# Patient Record
Sex: FEMALE | Race: WHITE | NOT HISPANIC OR LATINO | Employment: FULL TIME | URBAN - METROPOLITAN AREA
[De-identification: names, ages, dates, MRNs, and addresses within clinical notes are randomized per-mention and may not be internally consistent; named-entity substitution may affect disease eponyms.]

---

## 2023-09-15 PROBLEM — B00.51 HERPES SIMPLEX IRIDOCYCLITIS: Status: ACTIVE | Noted: 2023-09-15

## 2023-09-15 PROBLEM — R06.09 DYSPNEA ON EXERTION: Status: ACTIVE | Noted: 2023-09-15

## 2023-09-15 PROBLEM — B00.52 HERPES SIMPLEX KERATITIS: Status: ACTIVE | Noted: 2023-09-15

## 2023-09-15 PROBLEM — B00.50 OPHTHALMIC HERPES SIMPLEX: Status: ACTIVE | Noted: 2023-09-15

## 2023-09-15 PROBLEM — N95.1 MENOPAUSAL STATE: Status: ACTIVE | Noted: 2023-09-15

## 2023-09-15 PROBLEM — B02.30 HERPES ZOSTER OPHTHALMICUS: Status: ACTIVE | Noted: 2023-09-15

## 2023-09-15 PROBLEM — E55.9 VITAMIN D DEFICIENCY: Status: ACTIVE | Noted: 2023-09-15

## 2023-09-15 PROBLEM — H16.292: Status: ACTIVE | Noted: 2023-09-15

## 2023-09-15 PROBLEM — B00.53 HERPES SIMPLEX CONJUNCTIVITIS: Status: ACTIVE | Noted: 2023-09-15

## 2023-09-15 PROBLEM — I82.90 VENOUS THROMBOSIS: Status: ACTIVE | Noted: 2023-09-15

## 2023-09-15 RX ORDER — LYSINE HCL 500 MG
1 TABLET ORAL DAILY
COMMUNITY

## 2023-09-15 RX ORDER — METHYLPREDNISOLONE 4 MG/1
TABLET ORAL
COMMUNITY
Start: 2023-01-31 | End: 2023-12-06 | Stop reason: WASHOUT

## 2023-09-15 RX ORDER — CARBAMAZEPINE 200 MG/1
1 TABLET ORAL 2 TIMES DAILY
COMMUNITY
Start: 2023-01-24 | End: 2023-12-06 | Stop reason: WASHOUT

## 2023-10-25 ENCOUNTER — OFFICE VISIT (OUTPATIENT)
Dept: OPHTHALMOLOGY | Facility: CLINIC | Age: 73
End: 2023-10-25
Payer: COMMERCIAL

## 2023-10-25 DIAGNOSIS — B02.30 HERPES ZOSTER OPHTHALMICUS: Primary | ICD-10-CM

## 2023-10-25 DIAGNOSIS — H47.392 MYELINATED NERVE FIBERS OF OPTIC DISC OF LEFT EYE: ICD-10-CM

## 2023-10-25 DIAGNOSIS — B00.50 OPHTHALMIC HERPES SIMPLEX: ICD-10-CM

## 2023-10-25 DIAGNOSIS — B02.33 HERPES ZOSTER KERATOCONJUNCTIVITIS: ICD-10-CM

## 2023-10-25 DIAGNOSIS — H04.123 DRY EYES, BILATERAL: ICD-10-CM

## 2023-10-25 DIAGNOSIS — H25.813 COMBINED FORMS OF AGE-RELATED CATARACT OF BOTH EYES: ICD-10-CM

## 2023-10-25 PROBLEM — I82.90 VENOUS THROMBOSIS: Status: RESOLVED | Noted: 2023-09-15 | Resolved: 2023-10-25

## 2023-10-25 PROBLEM — B00.52 HERPES SIMPLEX KERATITIS: Status: RESOLVED | Noted: 2023-09-15 | Resolved: 2023-10-25

## 2023-10-25 PROBLEM — B00.51 HERPES SIMPLEX IRIDOCYCLITIS: Status: RESOLVED | Noted: 2023-09-15 | Resolved: 2023-10-25

## 2023-10-25 PROBLEM — B00.53 HERPES SIMPLEX CONJUNCTIVITIS: Status: RESOLVED | Noted: 2023-09-15 | Resolved: 2023-10-25

## 2023-10-25 PROCEDURE — 99213 OFFICE O/P EST LOW 20 MIN: CPT | Performed by: OPHTHALMOLOGY

## 2023-10-25 RX ORDER — VALACYCLOVIR HYDROCHLORIDE 500 MG/1
500 TABLET, FILM COATED ORAL DAILY
COMMUNITY
End: 2023-10-25 | Stop reason: SDUPTHER

## 2023-10-25 RX ORDER — PREDNISOLONE ACETATE 10 MG/ML
1 SUSPENSION/ DROPS OPHTHALMIC
COMMUNITY
Start: 2023-09-01 | End: 2023-12-06 | Stop reason: WASHOUT

## 2023-10-25 RX ORDER — VALACYCLOVIR HYDROCHLORIDE 500 MG/1
500 TABLET, FILM COATED ORAL DAILY
Qty: 30 TABLET | Refills: 2 | Status: SHIPPED | OUTPATIENT
Start: 2023-10-25

## 2023-10-25 ASSESSMENT — ENCOUNTER SYMPTOMS
HEMATOLOGIC/LYMPHATIC NEGATIVE: 0
GASTROINTESTINAL NEGATIVE: 0
ALLERGIC/IMMUNOLOGIC NEGATIVE: 0
PSYCHIATRIC NEGATIVE: 0
ENDOCRINE NEGATIVE: 0
MUSCULOSKELETAL NEGATIVE: 0
RESPIRATORY NEGATIVE: 0
CONSTITUTIONAL NEGATIVE: 0
CARDIOVASCULAR NEGATIVE: 0
EYES NEGATIVE: 0
NEUROLOGICAL NEGATIVE: 0

## 2023-10-25 ASSESSMENT — EXTERNAL EXAM - RIGHT EYE: OD_EXAM: 1+ BROW PTOSIS

## 2023-10-25 ASSESSMENT — REFRACTION_WEARINGRX
OS_AXIS: 105
OD_AXIS: 4
OS_CYLINDER: -0.25
OS_ADD: 2.50
OD_SPHERE: -1.75
SPECS_TYPE: PROGRESSIVE
OD_ADD: 2.50
OD_CYLINDER: -1.00
OS_SPHERE: -1.00

## 2023-10-25 ASSESSMENT — VISUAL ACUITY
OD_PH_CC: 20/25
OS_CC: 20/30
CORRECTION_TYPE: GLASSES
METHOD: SNELLEN - LINEAR
OD_CC: 20/40
OD_CC+: -1

## 2023-10-25 NOTE — PROGRESS NOTES
Subjective   Patient ID: Amy Colon is a 72 y.o. female.    Chief Complaint    Follow-up       HPI    No visual acuity (VA) complaints.    Follow up iritis HZO left eye (OS).  Now only on valtrex po qam.  No new changes in health history or meds.  Vision is good and stable.  No new complaints or problems.    Last edited by Salas Patten MD on 10/25/2023 12:23 PM.        Current Outpatient Medications (Ophthalmology pharm classes)   Medication Sig Dispense Refill    prednisoLONE acetate (Pred-Forte) 1 % ophthalmic suspension Administer 1 drop into the left eye. Every other day       Current Outpatient Medications (Other)   Medication Sig Dispense Refill    CALCIUM LACTATE ORAL Take 1 capsule by mouth once daily.      lysine  mg tablet Take 1 tablet by mouth once daily.      NON FORMULARY Immuplex Lozenge      NON FORMULARY 2 times a day. Tumeric      valACYclovir (Valtrex) 500 mg tablet Take 1 tablet (500 mg) by mouth once daily.      carBAMazepine (TEGretol) 200 mg tablet Take 1 tablet (200 mg) by mouth 2 times a day.      methylPREDNISolone (Medrol, Noel,) 4 mg tablets Take by mouth. As directed         Objective   Base Eye Exam       Visual Acuity (Snellen - Linear)         Right Left    Dist cc 20/40 -1 20/30    Dist ph cc 20/25       Correction: Glasses                  Slit Lamp and Fundus Exam       External Exam         Right Left    External 1+ Brow ptosis 1+ Brow ptosis; former HZO left eye (OS).              Slit Lamp Exam         Right Left    Lids/Lashes 1+ Blepharitis, 1+ Dermatochalasis - lower lid 1+ Blepharitis, 1+ Dermatochalasis - lower lid    Conjunctiva/Sclera normal bulbar and palepbral conjunctiva normal bulbar and palepbral conjunctiva    Cornea normal epi/stroma/endo and tear film normal epi/stroma/endo and tear film    Anterior Chamber ant. chamber deep and quiet ant. 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           RESOLVED: Ophthalmic herpes simplex - Primary

## 2023-12-06 ENCOUNTER — OFFICE VISIT (OUTPATIENT)
Dept: PRIMARY CARE | Facility: CLINIC | Age: 73
End: 2023-12-06
Payer: COMMERCIAL

## 2023-12-06 VITALS
TEMPERATURE: 96.1 F | WEIGHT: 128.1 LBS | DIASTOLIC BLOOD PRESSURE: 84 MMHG | HEART RATE: 73 BPM | OXYGEN SATURATION: 99 % | HEIGHT: 59 IN | SYSTOLIC BLOOD PRESSURE: 120 MMHG | RESPIRATION RATE: 18 BRPM | BODY MASS INDEX: 25.83 KG/M2

## 2023-12-06 DIAGNOSIS — E55.9 VITAMIN D DEFICIENCY: ICD-10-CM

## 2023-12-06 DIAGNOSIS — Z12.11 SCREENING FOR MALIGNANT NEOPLASM OF COLON: ICD-10-CM

## 2023-12-06 DIAGNOSIS — N95.1 MENOPAUSAL STATE: ICD-10-CM

## 2023-12-06 DIAGNOSIS — R15.1 FECAL SMEARING: ICD-10-CM

## 2023-12-06 DIAGNOSIS — Z12.31 ENCOUNTER FOR SCREENING MAMMOGRAM FOR MALIGNANT NEOPLASM OF BREAST: ICD-10-CM

## 2023-12-06 DIAGNOSIS — Z00.00 ANNUAL PHYSICAL EXAM: Primary | ICD-10-CM

## 2023-12-06 PROBLEM — R06.09 DYSPNEA ON EXERTION: Status: RESOLVED | Noted: 2023-09-15 | Resolved: 2023-12-06

## 2023-12-06 PROBLEM — H04.129 TEAR FILM INSUFFICIENCY: Status: ACTIVE | Noted: 2023-12-06

## 2023-12-06 PROBLEM — H04.123 DRY EYE SYNDROME OF BILATERAL LACRIMAL GLANDS: Status: ACTIVE | Noted: 2023-12-06

## 2023-12-06 PROBLEM — H52.31 ANISOMETROPIA: Status: ACTIVE | Noted: 2023-12-06

## 2023-12-06 PROBLEM — H25.10 NUCLEAR SENILE CATARACT: Status: ACTIVE | Noted: 2023-12-06

## 2023-12-06 PROBLEM — Q14.1 CONGENITAL MALFORMATION OF RETINA: Status: ACTIVE | Noted: 2023-12-06

## 2023-12-06 PROCEDURE — 1036F TOBACCO NON-USER: CPT | Performed by: FAMILY MEDICINE

## 2023-12-06 PROCEDURE — 99397 PER PM REEVAL EST PAT 65+ YR: CPT | Performed by: FAMILY MEDICINE

## 2023-12-06 PROCEDURE — 1126F AMNT PAIN NOTED NONE PRSNT: CPT | Performed by: FAMILY MEDICINE

## 2023-12-06 PROCEDURE — 1159F MED LIST DOCD IN RCRD: CPT | Performed by: FAMILY MEDICINE

## 2023-12-06 ASSESSMENT — LIFESTYLE VARIABLES
HOW MANY STANDARD DRINKS CONTAINING ALCOHOL DO YOU HAVE ON A TYPICAL DAY: PATIENT DOES NOT DRINK
HOW OFTEN DO YOU HAVE SIX OR MORE DRINKS ON ONE OCCASION: NEVER
AUDIT-C TOTAL SCORE: 0
HOW OFTEN DO YOU HAVE A DRINK CONTAINING ALCOHOL: NEVER
SKIP TO QUESTIONS 9-10: 1

## 2023-12-06 ASSESSMENT — PATIENT HEALTH QUESTIONNAIRE - PHQ9
2. FEELING DOWN, DEPRESSED OR HOPELESS: NOT AT ALL
1. LITTLE INTEREST OR PLEASURE IN DOING THINGS: NOT AT ALL
SUM OF ALL RESPONSES TO PHQ9 QUESTIONS 1 AND 2: 0

## 2023-12-06 ASSESSMENT — PAIN SCALES - GENERAL: PAINLEVEL: 0-NO PAIN

## 2023-12-06 NOTE — PROGRESS NOTES
"History Of Present Illness  Amy Colon is a 72 y.o. female presenting for \"Annual Exam (Physical. Patient wants alll testing done that can be done as she will lose insurance in Feb 2024 such as colonoscopy, mammogram,etc).\"    Here for her annual wellness exam.  Colon cancer screening is up-to-date, negative Cologuard on 6/28/2021.  She declines immunizations.      She sees Dr. Patten for herpes zoster ophthalmicus which is stable. Also recently had right cataract surgery with a different ophthalmologist.    She also follows with Dr. Seymour for feet care, had recent ingrown toenail removal on left big toe. Pain controlled. Today, wound appears to be healing routinely.    Her only concern today is fecal incontinence. She reports that for years she has had leaking from her anus. The discharge is usually not bloody or clear. When it is occasionally bloody she attributed it to hemorrhoids. The leaking is worsening so she is having to wear a pad. She otherwise has normal bowel movements. It is not associated with any other symptoms.              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    lysine  mg tablet Take 1 tablet by mouth once daily.    NON FORMULARY Immuplex Lozenge    NON FORMULARY 2 times a day. Tumeric    valACYclovir (Valtrex) 500 mg tablet Take 1 tablet (500 mg) by mouth once daily.    [DISCONTINUED] carBAMazepine " (TEGretol) 200 mg tablet Take 1 tablet (200 mg) by mouth 2 times a day.    [DISCONTINUED] methylPREDNISolone (Medrol, Noel,) 4 mg tablets Take by mouth. As directed    [DISCONTINUED] prednisoLONE acetate (Pred-Forte) 1 % ophthalmic suspension Administer 1 drop into the left eye. Every other day     No current facility-administered medications on file prior to visit.        Surgical History  She has a past surgical history that includes Cataract extraction.     Social History  She reports that she has quit smoking. Her smoking use included cigarettes. She has never used smokeless tobacco. She reports current alcohol use. She reports that she does not use drugs.    Family History  Family History   Problem Relation Name Age of Onset    Uterine cancer Mother      Osteoporosis Mother      Mental illness Mother      Other (borderline diabetes) Father      Other (skin cancer removed) Father      No Known Problems Brother      Other (drug use) Son      Heart attack Maternal Grandmother      Stroke Paternal Grandmother          Allergies  Patient has no known allergies.    ROS  Negative, except in HPI     Last Recorded Vitals  /84   Pulse 73   Temp 35.6 °C (96.1 °F)   Resp 18   Wt 58.1 kg (128 lb 1.6 oz)   SpO2 99%   Body mass index is 25.87 kg/m².     Physical Exam  Vitals and nursing note reviewed.   Constitutional:       Appearance: Normal appearance.   HENT:      Head: Normocephalic.      Right Ear: Tympanic membrane normal.      Left Ear: Tympanic membrane normal.      Nose: Nose normal.      Mouth/Throat:      Mouth: Mucous membranes are moist.   Eyes:      Extraocular Movements: Extraocular movements intact.      Conjunctiva/sclera: Conjunctivae normal.      Pupils: Pupils are equal, round, and reactive to light.   Cardiovascular:      Rate and Rhythm: Normal rate and regular rhythm.      Heart sounds: Normal heart sounds.   Pulmonary:      Effort: Pulmonary effort is normal. No respiratory distress.       Breath sounds: Normal breath sounds.   Abdominal:      General: Abdomen is flat.      Palpations: Abdomen is soft.      Tenderness: There is no abdominal tenderness.   Musculoskeletal:      Cervical back: Neck supple.   Lymphadenopathy:      Cervical: No cervical adenopathy.   Skin:     General: Skin is warm and dry.      Findings: No rash.   Neurological:      General: No focal deficit present.      Mental Status: She is alert. Mental status is at baseline.      Coordination: Coordination normal.      Gait: Gait normal.      Deep Tendon Reflexes: Reflexes normal.   Psychiatric:         Mood and Affect: Mood normal.         Behavior: Behavior normal.         Relevant Results  Lab Results   Component Value Date    WBC 6.2 03/27/2019    WBC 6.5 09/10/2018    HGB 12.7 03/27/2019    HGB 13.1 09/10/2018    HCT 40.3 03/27/2019    HCT 39.3 09/10/2018    MCV 93.7 03/27/2019    MCV 91.2 09/10/2018     03/27/2019     09/10/2018     Lab Results   Component Value Date     01/27/2023     03/27/2019    K 4.4 01/27/2023    K 4.4 03/27/2019     01/27/2023     03/27/2019    CO2 24 01/27/2023    CO2 23 (L) 03/27/2019    BUN 16 01/27/2023    BUN 21 03/27/2019    CREATININE 0.7 01/27/2023    CREATININE 0.7 03/27/2019    CALCIUM 9.4 01/27/2023    CALCIUM 9.6 03/27/2019    PROT 7.2 03/27/2019    BILITOT 0.3 03/27/2019    ALKPHOS 118 03/27/2019    ALT 16 03/27/2019    AST 28 03/27/2019    GLUCOSE 98 01/27/2023    GLUCOSE 98 03/27/2019       Assessment/Plan   Amy was seen today for annual exam.  Diagnoses and all orders for this visit:  Annual physical exam (Primary)  -     CT cardiac scoring wo IV contrast; Future  -     Comprehensive Metabolic Panel; Future  -     Lipid Panel; Future  -     TSH with reflex to Free T4 if abnormal; Future  -     CBC; Future  -     Hemoglobin A1C; Future  Encounter for screening mammogram for malignant neoplasm of breast  -     BI mammo bilateral screening  tomosynthesis; Future  Menopausal state  -     XR DEXA bone density; Future  Fecal smearing  -     Referral to General Surgery; Future  Screening for malignant neoplasm of colon  Vitamin D deficiency  -     Vitamin D 25-Hydroxy,Total (for eval of Vitamin D levels); Future  Other orders  -     Follow Up In Primary Care - Medicare Annual; Future     Medications Discontinued During This Encounter   Medication Reason    carBAMazepine (TEGretol) 200 mg tablet Med List Cleanup    methylPREDNISolone (Medrol, Noel,) 4 mg tablets Med List Cleanup    prednisoLONE acetate (Pred-Forte) 1 % ophthalmic suspension Med List Cleanup           Damián Neal MD

## 2023-12-13 ENCOUNTER — ANCILLARY PROCEDURE (OUTPATIENT)
Dept: RADIOLOGY | Facility: CLINIC | Age: 73
End: 2023-12-13
Payer: COMMERCIAL

## 2023-12-13 VITALS — HEIGHT: 59 IN | WEIGHT: 128 LBS | BODY MASS INDEX: 25.8 KG/M2

## 2023-12-13 DIAGNOSIS — M81.0 OSTEOPOROSIS, UNSPECIFIED OSTEOPOROSIS TYPE, UNSPECIFIED PATHOLOGICAL FRACTURE PRESENCE: Primary | ICD-10-CM

## 2023-12-13 DIAGNOSIS — N95.1 MENOPAUSAL STATE: ICD-10-CM

## 2023-12-13 DIAGNOSIS — Z12.31 ENCOUNTER FOR SCREENING MAMMOGRAM FOR MALIGNANT NEOPLASM OF BREAST: ICD-10-CM

## 2023-12-13 PROCEDURE — 77085 DXA BONE DENSITY AXL VRT FX: CPT

## 2023-12-13 PROCEDURE — 77067 SCR MAMMO BI INCL CAD: CPT

## 2023-12-14 ENCOUNTER — HOSPITAL ENCOUNTER (OUTPATIENT)
Facility: HOSPITAL | Age: 73
Setting detail: OUTPATIENT SURGERY
End: 2023-12-14
Attending: STUDENT IN AN ORGANIZED HEALTH CARE EDUCATION/TRAINING PROGRAM | Admitting: STUDENT IN AN ORGANIZED HEALTH CARE EDUCATION/TRAINING PROGRAM
Payer: COMMERCIAL

## 2023-12-14 ENCOUNTER — PREP FOR PROCEDURE (OUTPATIENT)
Dept: SURGERY | Facility: HOSPITAL | Age: 73
End: 2023-12-14
Payer: COMMERCIAL

## 2023-12-14 ENCOUNTER — OFFICE VISIT (OUTPATIENT)
Dept: SURGERY | Facility: CLINIC | Age: 73
End: 2023-12-14
Payer: COMMERCIAL

## 2023-12-14 VITALS
HEIGHT: 59 IN | BODY MASS INDEX: 25.6 KG/M2 | SYSTOLIC BLOOD PRESSURE: 110 MMHG | TEMPERATURE: 97.4 F | WEIGHT: 127 LBS | DIASTOLIC BLOOD PRESSURE: 70 MMHG | HEART RATE: 66 BPM | OXYGEN SATURATION: 96 %

## 2023-12-14 DIAGNOSIS — K64.2 PROLAPSED INTERNAL HEMORRHOIDS, GRADE 3: Primary | ICD-10-CM

## 2023-12-14 DIAGNOSIS — R15.1 FECAL SMEARING: ICD-10-CM

## 2023-12-14 PROCEDURE — 99214 OFFICE O/P EST MOD 30 MIN: CPT | Performed by: STUDENT IN AN ORGANIZED HEALTH CARE EDUCATION/TRAINING PROGRAM

## 2023-12-14 PROCEDURE — 46600 DIAGNOSTIC ANOSCOPY SPX: CPT | Performed by: STUDENT IN AN ORGANIZED HEALTH CARE EDUCATION/TRAINING PROGRAM

## 2023-12-14 PROCEDURE — 1159F MED LIST DOCD IN RCRD: CPT | Performed by: STUDENT IN AN ORGANIZED HEALTH CARE EDUCATION/TRAINING PROGRAM

## 2023-12-14 PROCEDURE — 99204 OFFICE O/P NEW MOD 45 MIN: CPT | Performed by: STUDENT IN AN ORGANIZED HEALTH CARE EDUCATION/TRAINING PROGRAM

## 2023-12-14 PROCEDURE — 1126F AMNT PAIN NOTED NONE PRSNT: CPT | Performed by: STUDENT IN AN ORGANIZED HEALTH CARE EDUCATION/TRAINING PROGRAM

## 2023-12-14 PROCEDURE — 1036F TOBACCO NON-USER: CPT | Performed by: STUDENT IN AN ORGANIZED HEALTH CARE EDUCATION/TRAINING PROGRAM

## 2023-12-14 RX ORDER — SODIUM CHLORIDE, SODIUM LACTATE, POTASSIUM CHLORIDE, CALCIUM CHLORIDE 600; 310; 30; 20 MG/100ML; MG/100ML; MG/100ML; MG/100ML
100 INJECTION, SOLUTION INTRAVENOUS CONTINUOUS
Status: CANCELLED | OUTPATIENT
Start: 2023-12-28

## 2023-12-14 ASSESSMENT — ENCOUNTER SYMPTOMS
HEADACHES: 0
RECTAL BLEEDING: 1
WOUND: 0
ANAL BLEEDING: 1
TROUBLE SWALLOWING: 0
SHORTNESS OF BREATH: 0
FACIAL ASYMMETRY: 0
VOICE CHANGE: 0
UNEXPECTED WEIGHT CHANGE: 0
NAUSEA: 0
SORE THROAT: 0
SPEECH DIFFICULTY: 0
DIARRHEA: 0
PALPITATIONS: 0
ABDOMINAL PAIN: 0
DYSURIA: 0
BLOOD IN STOOL: 0
BRUISES/BLEEDS EASILY: 0
VOMITING: 0
FEVER: 0
CHILLS: 0
CHEST TIGHTNESS: 0
ARTHRALGIAS: 0
ADENOPATHY: 0
HEMATURIA: 0

## 2023-12-14 ASSESSMENT — PATIENT HEALTH QUESTIONNAIRE - PHQ9
1. LITTLE INTEREST OR PLEASURE IN DOING THINGS: NOT AT ALL
2. FEELING DOWN, DEPRESSED OR HOPELESS: NOT AT ALL
SUM OF ALL RESPONSES TO PHQ9 QUESTIONS 1 AND 2: 0

## 2023-12-14 NOTE — PROGRESS NOTES
Patient ID: Amy Colon is a 72 y.o. female.    Anoscopy    Date/Time: 12/14/2023 9:15 AM    Performed by: Valeri Bean MD  Authorized by: Valeri Bean MD    Consent:     Consent obtained:  Verbal and written    Consent given by:  Patient    Risks, benefits, and alternatives were discussed: yes      Risks discussed:  Bleeding and pain    Alternatives discussed:  No treatment  Universal protocol:     Procedure explained and questions answered to patient or proxy's satisfaction: yes      Immediately prior to procedure, a time out was called: yes      Patient identity confirmed:  Verbally with patient  Indications:     Indications: rectal bleeding    Procedure details:     Internal hemorrhoids: yes (Large prolapsed right anterior internal hemorrhoid)      Anal fissures: no      Anal fistulae: no      Abscess: no    Post-procedure details:     Procedure completion:  Tolerated well, no immediate complications

## 2023-12-14 NOTE — PATIENT INSTRUCTIONS
Thank you for scheduling surgery with Dr. Bean. Below you will find your Surgery Itinerary to include dates/times and locations for appointments involved with your procedure.  Pre-Admission Testing at: [facility] - [address] [place] [address]  On __________________________________________________    To find out what time to report to surgery, please call the Surgery Center in between 2pm and 4pm   [facility] [phone]  On Wednesday December 27th, 2023    Nothing to eat or drink after midnight the night before surgery    Surgery with Dr. Bean at: [facility] - [address] [place] [address]   On Thursday December 28th, 2023    *Please note, you may receive a call from our financial counselors if you have a financial liability greater than $250.

## 2023-12-14 NOTE — PROGRESS NOTES
History Of Present Illness  Amy Colon is a 72 y.o. female presenting with mucus and blood drainage per rectum. She has had hemorrhoids since childbirth but they have not been particularly bothersome.  More recently she has noted blood and mucus drainage from her rectum that requires her to wear 1-2 pads a day.  She also has hygiene issues after having a bowel movement since she has to clean extra around the hemorrhoids.  She denies any incontinence to flatus or stool.  She had a colonoscopy was many years ago, however she recently had a negative Cologuard in 2021.     Past Medical History  Past Medical History:   Diagnosis Date    Shingles        Surgical History  Past Surgical History:   Procedure Laterality Date    CATARACT EXTRACTION      KNEE ARTHROPLASTY Left 2017        Social History  She reports that she has quit smoking. Her smoking use included cigarettes. She has never used smokeless tobacco. She reports current alcohol use. She reports that she does not use drugs.    Family History  Family History   Problem Relation Name Age of Onset    Uterine cancer Mother      Osteoporosis Mother      Mental illness Mother      Other (borderline diabetes) Father      Other (skin cancer removed) Father      No Known Problems Brother      Other (drug use) Son      Heart attack Maternal Grandmother      Stroke Paternal Grandmother          Allergies  Patient has no known allergies.    Review of Systems   Constitutional:  Negative for chills, fever and unexpected weight change.   HENT:  Negative for sneezing, sore throat, trouble swallowing and voice change.    Respiratory:  Negative for chest tightness and shortness of breath.    Cardiovascular:  Negative for chest pain and palpitations.   Gastrointestinal:  Positive for anal bleeding. Negative for abdominal pain, blood in stool, diarrhea, nausea and vomiting.   Endocrine: Negative for cold intolerance and heat intolerance.   Genitourinary:  Negative for  "decreased urine volume, dysuria and hematuria.   Musculoskeletal:  Negative for arthralgias and gait problem.   Skin:  Negative for rash and wound.   Neurological:  Negative for facial asymmetry, speech difficulty and headaches.   Hematological:  Negative for adenopathy. Does not bruise/bleed easily.   Psychiatric/Behavioral:  Negative for self-injury and suicidal ideas.         Physical Exam  Vitals and nursing note reviewed.   Constitutional:       Appearance: Normal appearance.   HENT:      Head: Normocephalic and atraumatic.      Mouth/Throat:      Mouth: Mucous membranes are moist.      Pharynx: Oropharynx is clear.   Eyes:      Extraocular Movements: Extraocular movements intact.      Pupils: Pupils are equal, round, and reactive to light.   Cardiovascular:      Rate and Rhythm: Normal rate and regular rhythm.      Pulses: Normal pulses.   Pulmonary:      Effort: Pulmonary effort is normal.      Breath sounds: Normal breath sounds.   Abdominal:      General: There is no distension.      Palpations: Abdomen is soft.      Tenderness: There is no abdominal tenderness.   Genitourinary:      Musculoskeletal:      Cervical back: Normal range of motion and neck supple.   Skin:     General: Skin is warm and dry.   Neurological:      General: No focal deficit present.      Mental Status: She is alert and oriented to person, place, and time.   Psychiatric:         Mood and Affect: Mood normal.         Behavior: Behavior normal.          Last Recorded Vitals  Blood pressure 110/70, pulse 66, temperature 36.3 °C (97.4 °F), height 1.499 m (4' 11\"), weight 57.6 kg (127 lb), SpO2 96 %.         Assessment/Plan   Problem List Items Addressed This Visit             ICD-10-CM       Gastrointestinal and Abdominal    Prolapsed internal hemorrhoids, grade 3 - Primary K64.2     Other Visit Diagnoses         Codes    Fecal smearing     R15.1           72-year-old female who presents with mucus and blood per rectum.  She does have " moisture and blood spots in the pad she is currently wearing.  On exam she has a large prolapsed right anterior internal hemorrhoid.  she reports sometimes it does go back inside but for the most part it stays prolapsed.  There is no pain associated with this.  We discussed multiple ways to treat hemorrhoids, however overall given its large size and prolapse state I have recommended an excisional hemorrhoidectomy.  We discussed the risks and benefits with the use of pictures.   We discussed the expected postoperative course which includes pain and discomfort for at least the first 1 to 2 weeks with slow improvement over time.  Discussed the postoperative pain medication regimen and sitz bath's.  All questions were answered.  Will schedule at her convenience.      Valeri Bean MD

## 2023-12-18 ENCOUNTER — HOSPITAL ENCOUNTER (OUTPATIENT)
Dept: RADIOLOGY | Facility: HOSPITAL | Age: 73
Discharge: HOME | End: 2023-12-18
Payer: COMMERCIAL

## 2023-12-18 DIAGNOSIS — R92.8 OTHER ABNORMAL AND INCONCLUSIVE FINDINGS ON DIAGNOSTIC IMAGING OF BREAST: ICD-10-CM

## 2023-12-18 PROCEDURE — 77061 BREAST TOMOSYNTHESIS UNI: CPT | Mod: LT

## 2023-12-22 ENCOUNTER — PRE-ADMISSION TESTING (OUTPATIENT)
Dept: PREADMISSION TESTING | Facility: HOSPITAL | Age: 73
End: 2023-12-22
Payer: COMMERCIAL

## 2023-12-22 ENCOUNTER — LAB (OUTPATIENT)
Dept: LAB | Facility: LAB | Age: 73
End: 2023-12-22
Payer: COMMERCIAL

## 2023-12-22 VITALS
HEIGHT: 59 IN | HEART RATE: 59 BPM | DIASTOLIC BLOOD PRESSURE: 72 MMHG | BODY MASS INDEX: 25.78 KG/M2 | TEMPERATURE: 96.8 F | OXYGEN SATURATION: 100 % | SYSTOLIC BLOOD PRESSURE: 155 MMHG | WEIGHT: 127.87 LBS

## 2023-12-22 DIAGNOSIS — E55.9 VITAMIN D DEFICIENCY: ICD-10-CM

## 2023-12-22 DIAGNOSIS — Z00.00 ANNUAL PHYSICAL EXAM: ICD-10-CM

## 2023-12-22 LAB
25(OH)D3 SERPL-MCNC: 63 NG/ML (ref 31–100)
ALBUMIN SERPL-MCNC: 4.5 G/DL (ref 3.5–5)
ALP BLD-CCNC: 102 U/L (ref 35–125)
ALT SERPL-CCNC: 17 U/L (ref 5–40)
ANION GAP SERPL CALC-SCNC: 13 MMOL/L
AST SERPL-CCNC: 26 U/L (ref 5–40)
BILIRUB SERPL-MCNC: 0.3 MG/DL (ref 0.1–1.2)
BUN SERPL-MCNC: 14 MG/DL (ref 8–25)
CALCIUM SERPL-MCNC: 9.8 MG/DL (ref 8.5–10.4)
CHLORIDE SERPL-SCNC: 103 MMOL/L (ref 97–107)
CHOLEST SERPL-MCNC: 251 MG/DL (ref 133–200)
CHOLEST/HDLC SERPL: 2.7 {RATIO}
CO2 SERPL-SCNC: 26 MMOL/L (ref 24–31)
CREAT SERPL-MCNC: 0.7 MG/DL (ref 0.4–1.6)
ERYTHROCYTE [DISTWIDTH] IN BLOOD BY AUTOMATED COUNT: 13 % (ref 11.5–14.5)
EST. AVERAGE GLUCOSE BLD GHB EST-MCNC: 117 MG/DL
GFR SERPL CREATININE-BSD FRML MDRD: >90 ML/MIN/1.73M*2
GLUCOSE SERPL-MCNC: 109 MG/DL (ref 65–99)
HBA1C MFR BLD: 5.7 %
HCT VFR BLD AUTO: 41.8 % (ref 36–46)
HDLC SERPL-MCNC: 94 MG/DL
HGB BLD-MCNC: 13.9 G/DL (ref 12–16)
LDLC SERPL CALC-MCNC: 128 MG/DL (ref 65–130)
MCH RBC QN AUTO: 32.1 PG (ref 26–34)
MCHC RBC AUTO-ENTMCNC: 33.3 G/DL (ref 32–36)
MCV RBC AUTO: 97 FL (ref 80–100)
NRBC BLD-RTO: 0 /100 WBCS (ref 0–0)
PLATELET # BLD AUTO: 307 X10*3/UL (ref 150–450)
POTASSIUM SERPL-SCNC: 4.2 MMOL/L (ref 3.4–5.1)
PROT SERPL-MCNC: 6.8 G/DL (ref 5.9–7.9)
RBC # BLD AUTO: 4.33 X10*6/UL (ref 4–5.2)
SODIUM SERPL-SCNC: 142 MMOL/L (ref 133–145)
TRIGL SERPL-MCNC: 144 MG/DL (ref 40–150)
TSH SERPL DL<=0.05 MIU/L-ACNC: 0.86 MIU/L (ref 0.27–4.2)
WBC # BLD AUTO: 6.5 X10*3/UL (ref 4.4–11.3)

## 2023-12-22 PROCEDURE — 80053 COMPREHEN METABOLIC PANEL: CPT

## 2023-12-22 PROCEDURE — 85027 COMPLETE CBC AUTOMATED: CPT

## 2023-12-22 PROCEDURE — 82306 VITAMIN D 25 HYDROXY: CPT

## 2023-12-22 PROCEDURE — 99203 OFFICE O/P NEW LOW 30 MIN: CPT | Performed by: NURSE PRACTITIONER

## 2023-12-22 PROCEDURE — 83036 HEMOGLOBIN GLYCOSYLATED A1C: CPT

## 2023-12-22 PROCEDURE — 80061 LIPID PANEL: CPT

## 2023-12-22 PROCEDURE — 36415 COLL VENOUS BLD VENIPUNCTURE: CPT

## 2023-12-22 PROCEDURE — 94760 N-INVAS EAR/PLS OXIMETRY 1: CPT

## 2023-12-22 PROCEDURE — 84443 ASSAY THYROID STIM HORMONE: CPT

## 2023-12-22 RX ORDER — ROSUVASTATIN CALCIUM 20 MG/1
1 TABLET, FILM COATED ORAL DAILY
COMMUNITY

## 2023-12-22 ASSESSMENT — DUKE ACTIVITY SCORE INDEX (DASI)
CAN YOU TAKE CARE OF YOURSELF (EAT, DRESS, BATHE, OR USE TOILET): YES
CAN YOU RUN A SHORT DISTANCE: YES
CAN YOU DO HEAVY WORK AROUND THE HOUSE LIKE SCRUBBING FLOORS OR LIFTING AND MOVING HEAVY FURNITURE: YES
DASI METS SCORE: 9
CAN YOU DO LIGHT WORK AROUND THE HOUSE LIKE DUSTING OR WASHING DISHES: YES
TOTAL_SCORE: 50.7
CAN YOU DO YARD WORK LIKE RAKING LEAVES, WEEDING OR PUSHING A MOWER: YES
CAN YOU WALK INDOORS, SUCH AS AROUND YOUR HOUSE: YES
CAN YOU PARTICIPATE IN STRENOUS SPORTS LIKE SWIMMING, SINGLES TENNIS, FOOTBALL, BASKETBALL, OR SKIING: NO
CAN YOU CLIMB A FLIGHT OF STAIRS OR WALK UP A HILL: YES
CAN YOU PARTICIPATE IN MODERATE RECREATIONAL ACTIVITIES LIKE GOLF, BOWLING, DANCING, DOUBLES TENNIS OR THROWING A BASEBALL OR FOOTBALL: YES
CAN YOU WALK A BLOCK OR TWO ON LEVEL GROUND: YES
CAN YOU HAVE SEXUAL RELATIONS: YES
CAN YOU DO MODERATE WORK AROUND THE HOUSE LIKE VACUUMING, SWEEPING FLOORS OR CARRYING GROCERIES: YES

## 2023-12-22 ASSESSMENT — CHADS2 SCORE
HYPERTENSION: NO
DIABETES: NO
CHADS2 SCORE: 0
CHF: NO
PRIOR STROKE OR TIA OR THROMBOEMBOLISM: NO
AGE GREATER THAN OR EQUAL TO 75: NO

## 2023-12-22 ASSESSMENT — ENCOUNTER SYMPTOMS
CARDIOVASCULAR NEGATIVE: 1
ALLERGIC/IMMUNOLOGIC NEGATIVE: 1
NEUROLOGICAL NEGATIVE: 1
ANAL BLEEDING: 1
RESPIRATORY NEGATIVE: 1
MUSCULOSKELETAL NEGATIVE: 1
EYES NEGATIVE: 1
HEMATOLOGIC/LYMPHATIC NEGATIVE: 1
CONSTITUTIONAL NEGATIVE: 1
ENDOCRINE NEGATIVE: 1
PSYCHIATRIC NEGATIVE: 1

## 2023-12-22 ASSESSMENT — PAIN - FUNCTIONAL ASSESSMENT: PAIN_FUNCTIONAL_ASSESSMENT: 0-10

## 2023-12-22 ASSESSMENT — PAIN SCALES - GENERAL: PAINLEVEL_OUTOF10: 0 - NO PAIN

## 2023-12-22 NOTE — CPM/PAT H&P
CPM/PAT Evaluation       Name: Amy Colon (Amy Colon)  /Age: 1950/ y.o.     In-Person       Chief Complaint: Prolapsed internal hemorrhoids, grade 3     HPI    72 year old female with internal hemorrhoids. Reports mucus and blood draining from rectum. Has had hemorrhoids since childbirth, not bothersome. Recently she has noticed blood and mucus draining-wearing pads, reports hygenic issues since she has to clean around hemorrhoids. Denies bowel incontinence, abdominal pain. Scheduled for excision hemorrhoids 23.    Past Medical History:   Diagnosis Date    Shingles        Past Surgical History:   Procedure Laterality Date    CATARACT EXTRACTION      KNEE ARTHROPLASTY Left        Patient  has no history on file for sexual activity.    Family History   Problem Relation Name Age of Onset    Uterine cancer Mother      Osteoporosis Mother      Mental illness Mother      Other (borderline diabetes) Father      Other (skin cancer removed) Father      No Known Problems Brother      Other (drug use) Son      Heart attack Maternal Grandmother      Stroke Paternal Grandmother         No Known Allergies    Current Outpatient Medications   Medication Sig Dispense Refill    CALCIUM LACTATE ORAL Take 1 capsule by mouth once daily.      gentamicin-prednisolone (Pred-G) 0.3-1 % ophthalmic drops Administer 1 drop into both eyes once daily.      lysine  mg tablet Take 1 tablet by mouth once daily.      NON FORMULARY Immuplex Lozenge      NON FORMULARY 2 times a day. Tumeric      valACYclovir (Valtrex) 500 mg tablet Take 1 tablet (500 mg) by mouth once daily. 30 tablet 2     No current facility-administered medications for this visit.       Review of Systems   Constitutional: Negative.    HENT: Negative.     Eyes: Negative.    Respiratory: Negative.     Cardiovascular: Negative.    Gastrointestinal:  Positive for anal bleeding.   Endocrine: Negative.    Genitourinary: Negative.   "  Musculoskeletal: Negative.    Allergic/Immunologic: Negative.    Neurological: Negative.    Hematological: Negative.    Psychiatric/Behavioral: Negative.           Physical Exam  Constitutional:       Appearance: Normal appearance.   HENT:      Head: Normocephalic and atraumatic.      Mouth/Throat:      Mouth: Mucous membranes are moist.   Eyes:      Extraocular Movements: Extraocular movements intact.      Pupils: Pupils are equal, round, and reactive to light.      Comments: Glasses   Cardiovascular:      Rate and Rhythm: Normal rate and regular rhythm.      Pulses: Normal pulses.      Heart sounds: Normal heart sounds.   Pulmonary:      Effort: Pulmonary effort is normal.      Breath sounds: Normal breath sounds.   Abdominal:      General: Bowel sounds are normal.      Palpations: Abdomen is soft.   Musculoskeletal:         General: Normal range of motion.      Cervical back: Normal range of motion.   Skin:     General: Skin is warm and dry.      Capillary Refill: Capillary refill takes less than 2 seconds.   Neurological:      Mental Status: She is alert and oriented to person, place, and time.   Psychiatric:         Mood and Affect: Mood normal.          PAT AIRWAY:   Airway:     Mallampati::  III    Neck ROM::  Full  normal        /72   Pulse 59   Temp 36 °C (96.8 °F) (Temporal)   Ht 1.499 m (4' 11\")   Wt 58 kg (127 lb 13.9 oz)   SpO2 100%   BMI 25.83 kg/m²         DASI Risk Score      Flowsheet Row Most Recent Value   DASI SCORE 50.7   METS Score (Will be calculated only when all the questions are answered) 9          Caprini DVT Assessment    No data to display       Modified Frailty Index    No data to display       CHADS2 Stroke Risk         N/A 3 - 100%: High Risk   2 - 3%: Medium Risk   0 - 2%: Low Risk     Last Change: N/A          This score determines the patient's risk of having a stroke if the patient has atrial fibrillation.        This score is not applicable to this patient. " Components are not calculated.          Revised Cardiac Risk Index    No data to display       Apfel Simplified Score    No data to display       Risk Analysis Index Results This Encounter    No data found in the last 1 encounters.       Stop Bang Score      Flowsheet Row Most Recent Value   Do you snore loudly? 0   Do you often feel tired or fatigued after your sleep? 0   Has anyone ever observed you stop breathing in your sleep? 0   Do you have or are you being treated for high blood pressure? 0   Recent BMI (Calculated) 25.8   Is BMI greater than 35 kg/m2? 0=No   Age older than 50 years old? 1=Yes   Is your neck circumference greater than 17 inches (Male) or 16 inches (Female)? 0   Gender - Male 0=No   STOP-BANG Total Score 1          ASA: 2  DASI Risk Score: 50.7  METS: 9  CHADS2: 0  REVISED CARDIAC RISK INDEX: 0.4%  STOPBAN    Assessment and Plan:   Prolapsed internal hemorrhoids, grade 3   Plan: Excision Hemorrhoid 23

## 2023-12-22 NOTE — PREPROCEDURE INSTRUCTIONS
Medication List            Accurate as of December 22, 2023 12:22 PM. Always use your most recent med list.                CALCIUM LACTATE ORAL  Medication Adjustments for Surgery: Stop 7 days before surgery     lysine  mg tablet  Medication Adjustments for Surgery: Stop 7 days before surgery     NON FORMULARY  Medication Adjustments for Surgery: Stop 7 days before surgery     NON FORMULARY  Medication Adjustments for Surgery: Stop 7 days before surgery     Pred-G 0.3-1 % ophthalmic drops  Generic drug: gentamicin-prednisolone  Medication Adjustments for Surgery: Take morning of surgery with sip of water, no other fluids     valACYclovir 500 mg tablet  Commonly known as: Valtrex  Take 1 tablet (500 mg) by mouth once daily.  Medication Adjustments for Surgery: Take morning of surgery with sip of water, no other fluids                              NPO Instructions:    Do not eat any food after midnight the night before your surgery/procedure.  You may have clear liquids until TWO hours before surgery/procedure. This includes water, black tea/coffee, (no milk or cream) apple juice and electrolyte drinks (Gatorade).    Additional Instructions:     Day of Surgery:  Review your medication instructions, take indicated medications  You may have clear liquids until TWO hours before surgery/procedure.  This includes water, black tea/coffee, (no milk or cream) apple juice and electrolyte drinks (Gatorade)  Wear  comfortable loose fitting clothing  Do not use moisturizers, creams, lotions or perfume  All jewelry and valuables should be left at home  PAT DISCHARGE INSTRUCTIONS    Please call the Same Day Surgery (SDS) Department of the hospital where your procedure will be performed after 2:00 PM the day before your surgery. If you are scheduled on a Monday, or a Tuesday following a Monday holiday, you will need to call on the last business day prior to your surgery.    [facility]  [place]  [id number] [place] [address], [address] [phone]    [facility]  [address] [place] [address] [phone]    [facility]  [address]  [place], [place] [id number] [phone]    Please let your surgeon know if:      You develop any open sores, shingles, burning or painful urination as these may increase your risk of an infection.   You no longer wish to have the surgery.   Any other personal circumstances change that may lead to the need to cancel or defer this surgery-such as being sick or getting admitted to any hospital within one week of your planned procedure.    Your contact details change, such as a change of address or phone number.    Starting now:     Please DO NOT drink alcohol or smoke for 24 hours before surgery. It is well known that quitting smoking can make a huge difference to your health and recovery from surgery. The longer you abstain from smoking, the better your chances of a healthy recovery. If you need help with quitting, call 7-800QUIT-NOW to be connected to a trained counselor who will discuss the best methods to help you quit.     Before your surgery:    Please stop all supplements 7 days prior to surgery. Or as directed by your surgeon.   Please stop taking NSAID pain medicine such as Advil and Motrin 7 days before surgery.    If you develop any fever, cough, cold, rashes, cuts, scratches, scrapes, urinary symptoms or infection anywhere on your body (including teeth and gums) prior to surgery, please call your surgeon’s office as soon as possible. This may require treatment to reduce the chance of cancellation on the day of surgery.    The day before your surgery:   DIET- Do not eat any food after MIDNIGHT. May have 10 ounces of CLEAR LIQUIDS until TWO HOURS before your arrival time. This includes water, black tea or coffee (no milk ir cream), apple juice and electrolyte drinks (Gatorade). May  chew gum until TWO hours before your surgery time.   Get a good night’s rest.  Use the special soap for bathing if you have been instructed to use one.    Scheduled surgery times may change and you will be notified if this occurs - please check your personal voicemail for any updates.     On the morning of surgery:   Wear comfortable, loose fitting clothes which open in the front. Please do not wear moisturizers, creams, lotions, makeup or perfume.    Please bring with you to surgery:   Photo ID and insurance card   Current list of medicines and allergies   Pacemaker/ Defibrillator/Heart stent cards   CPAP machine and mask    Slings/ splints/ crutches   A copy of your complete advanced directive/DHPOA.    Please do NOT bring with you to surgery:   All jewelry and valuables should be left at home.   Prosthetic devices such as contact lenses, hearing aids, dentures, eyelash extensions, hairpins and body piercings must be removed prior to going in to the surgical suite.    After outpatient surgery:   A responsible adult MUST accompany you at the time of discharge and stay with you for 24 hours after your surgery. You may NOT drive yourself home after surgery.    Do not drive, operate machinery, make critical decisions or do activities that require co-ordination or balance until after a night’s sleep.   Do not drink alcoholic beverages for 24 hours.   Instructions for resuming your medications will be provided by your surgeon.    CALL YOUR DOCTOR AFTER SURGERY IF YOU HAVE:     Chills and/or a fever of 101° F or higher.    Redness, swelling, pus or drainage from your surgical wound or a bad smell from the wound.    Lightheadedness, fainting or confusion.    Persistent vomiting (throwing up) and are not able to eat or drink for 12 hours.    Three or more loose, watery bowel movements in 24 hours (diarrhea).   Difficulty or pain while urinating( after non-urological surgery)    Pain and swelling in your legs, especially if  it is only on one side.    Difficulty breathing or are breathing faster than normal.    Any new concerning symptoms.

## 2023-12-26 ENCOUNTER — TELEPHONE (OUTPATIENT)
Dept: SURGERY | Facility: CLINIC | Age: 73
End: 2023-12-26

## 2023-12-26 NOTE — TELEPHONE ENCOUNTER
Patient called to cancel surgery with Dr Bean on 12/28/2023 due to being ill. Patient stated she will call office back to reschedule when ready. [facility] schedulers messaged about cancellation.

## 2023-12-29 NOTE — RESULT ENCOUNTER NOTE
Cholesterol about the same. Normal liver and kidney function, thyroid function, vitamin D, and blood counts. Prediabetic but not diabetic.

## 2024-01-15 PROBLEM — R15.1 FECAL SMEARING: Status: ACTIVE | Noted: 2024-01-15

## 2024-01-15 PROBLEM — B02.8 ZOSTER WITH OTHER COMPLICATIONS: Status: ACTIVE | Noted: 2024-01-15

## 2024-01-15 PROBLEM — Z12.31 ENCOUNTER FOR SCREENING MAMMOGRAM FOR MALIGNANT NEOPLASM OF BREAST: Status: ACTIVE | Noted: 2024-01-15

## 2024-01-15 PROBLEM — H47.399: Status: ACTIVE | Noted: 2023-10-25

## 2024-01-15 PROBLEM — G50.0 TRIGEMINAL NEURALGIA: Status: ACTIVE | Noted: 2023-02-08

## 2024-01-16 NOTE — TELEPHONE ENCOUNTER
Patient contacted regarding rescheduling surgery with Dr Bean. Patient not ready to reschedule. Advised patient the office will follow up in a month to discuss rescheduling. Patient verbalized understanding.

## 2024-01-25 ENCOUNTER — APPOINTMENT (OUTPATIENT)
Dept: SURGERY | Facility: CLINIC | Age: 74
End: 2024-01-25
Payer: COMMERCIAL

## 2024-02-01 ENCOUNTER — HOSPITAL ENCOUNTER (OUTPATIENT)
Dept: RADIOLOGY | Facility: CLINIC | Age: 74
Discharge: HOME | End: 2024-02-01
Payer: COMMERCIAL

## 2024-02-01 DIAGNOSIS — Z00.00 ANNUAL PHYSICAL EXAM: ICD-10-CM

## 2024-02-01 PROCEDURE — 75571 CT HRT W/O DYE W/CA TEST: CPT

## 2024-04-03 ENCOUNTER — OFFICE VISIT (OUTPATIENT)
Dept: RHEUMATOLOGY | Facility: CLINIC | Age: 74
End: 2024-04-03
Payer: MEDICARE

## 2024-04-03 VITALS
OXYGEN SATURATION: 98 % | HEART RATE: 74 BPM | DIASTOLIC BLOOD PRESSURE: 74 MMHG | HEIGHT: 59 IN | BODY MASS INDEX: 26 KG/M2 | WEIGHT: 129 LBS | SYSTOLIC BLOOD PRESSURE: 130 MMHG

## 2024-04-03 DIAGNOSIS — M81.0 OSTEOPOROSIS, POST-MENOPAUSAL: Primary | ICD-10-CM

## 2024-04-03 PROCEDURE — 1159F MED LIST DOCD IN RCRD: CPT | Performed by: INTERNAL MEDICINE

## 2024-04-03 PROCEDURE — 1036F TOBACCO NON-USER: CPT | Performed by: INTERNAL MEDICINE

## 2024-04-03 PROCEDURE — 99204 OFFICE O/P NEW MOD 45 MIN: CPT | Performed by: INTERNAL MEDICINE

## 2024-04-03 PROCEDURE — 1126F AMNT PAIN NOTED NONE PRSNT: CPT | Performed by: INTERNAL MEDICINE

## 2024-04-03 PROCEDURE — 99214 OFFICE O/P EST MOD 30 MIN: CPT | Performed by: INTERNAL MEDICINE

## 2024-04-03 ASSESSMENT — ENCOUNTER SYMPTOMS
NERVOUS/ANXIOUS: 0
TREMORS: 0
CARDIOVASCULAR NEGATIVE: 1
ARTHRALGIAS: 0
EYES NEGATIVE: 1
SORE THROAT: 0
DYSURIA: 0
ACTIVITY CHANGE: 0
OCCASIONAL FEELINGS OF UNSTEADINESS: 0
CONFUSION: 0
BACK PAIN: 0
COUGH: 0
ABDOMINAL PAIN: 0
HEMATOLOGIC/LYMPHATIC NEGATIVE: 1
NAUSEA: 0
FREQUENCY: 0
ALLERGIC/IMMUNOLOGIC NEGATIVE: 1
DIFFICULTY URINATING: 0
EYE PAIN: 0
APPETITE CHANGE: 0
FEVER: 0
SEIZURES: 0
JOINT SWELLING: 0
BLOOD IN STOOL: 0
AGITATION: 0
LOSS OF SENSATION IN FEET: 0
MYALGIAS: 0
ENDOCRINE NEGATIVE: 1
WEAKNESS: 0
NECK PAIN: 0
CONSTITUTIONAL NEGATIVE: 1
HEADACHES: 0
CONSTIPATION: 0
TROUBLE SWALLOWING: 0
WHEEZING: 0
EYE REDNESS: 0
PHOTOPHOBIA: 0
DIZZINESS: 0
DIARRHEA: 0
RESPIRATORY NEGATIVE: 1
GASTROINTESTINAL NEGATIVE: 1
DEPRESSION: 0
LIGHT-HEADEDNESS: 0
SHORTNESS OF BREATH: 0
NUMBNESS: 0
FATIGUE: 0

## 2024-04-03 ASSESSMENT — ROUTINE ASSESSMENT OF PATIENT INDEX DATA (RAPID3)
FEELINGS_ANXIETY_NERVOUS: WITHOUT ANY DIFFICULTY
FN_SCORE: 0
ON A SCALE OF ONE TO TEN, CONSIDERING ALL THE WAYS IN WHICH ILLNESS AND HEALTH CONDITIONS MAY AFFECT YOU AT THIS TIME, PLEASE INDICATE BELOW HOW YOU ARE DOING:: 0
WALK_FLAT_GROUND: WITHOUT ANY DIFFICULTY
ON A SCALE OF ONE TO TEN, HOW MUCH PAIN HAVE YOU HAD BECAUSE OF YOUR CONDITION OVER THE PAST WEEK?: 0
FEELINGS_DEPRESSION: WITHOUT ANY DIFFICULTY
PICK_CLOTHES_OFF_FLOOR: WITHOUT ANY DIFFICULTY
GOOD_NIGHTS_SLEEP: WITHOUT ANY DIFFICULTY
TOTAL RAPID3 SCORE: 0
IN_OUT_TRANSPORT: WITHOUT ANY DIFFICULTY
DRESS_YOURSELF: WITHOUT ANY DIFFICULTY
PARTIPATE_RECREATIONAL_ACTIVITIES: WITHOUT ANY DIFFICULTY
WEIGHTED_TOTAL_SCORE: 0
SEVERITY_SCORE: 0
TURN_FAUCETS_OFF: WITHOUT ANY DIFFICULTY
WASH_DRY_BODY: WITHOUT ANY DIFFICULTY
IN_OUT_BED: WITHOUT ANY DIFFICULTY
LIFT_CUP_TO_MOUTH: WITHOUT ANY DIFFICULTY
SUM OF QUESTIONS A TO J: 0
ON A SCALE OF ONE TO TEN, CONSIDERING ALL THE WAYS IN WHICH ILLNESS AND HEALTH CONDITIONS MAY AFFECT YOU AT THIS TIME, PLEASE INDICATE BELOW HOW YOU ARE DOING:: 0
ON A SCALE OF ONE TO TEN, HOW MUCH PAIN HAVE YOU HAD BECAUSE OF YOUR CONDITION OVER THE PAST WEEK?: 0
WALK_KILOMETERS: WITHOUT ANY DIFFICULTY

## 2024-04-03 ASSESSMENT — PAIN SCALES - GENERAL: PAINLEVEL: 0-NO PAIN

## 2024-04-03 NOTE — PROGRESS NOTES
"Chief Complaint:    This 73 y.o. female is referred for the diagnosis of osteoporosis (OP) based on a DXA scan.     Subjective:   HPI   Problem:  1: OP       The patient is referred by Dr. Neal for the diagnosis of OP based on a recent DXA scan performed on 12/13/2023.       There is a family history of OP in the patient's mother. The patient, who was 89 at the time, suffered a hip fracture, and her mother passed away within a month of that fracture. Her mother apparently was treated for about 10 yr prior to the fracture with ALENDRONATE, presumably due to the diagnosis of OP.       The patient herself has never sustained a fracture. Moreover, the patient has no history of back pain, deep bony pain, pain involving the distal extremities, or curvature of the spine (i.e., kyphosis).         There is no history of hyperparathyroidism or osteomalacia.       The patient does eat a diet inclusive of ice cream, occasional milk, yogurts, and hard cheeses. Rarely she eats other foods such cream cheese or cottage cheese.       The patient's peak height at about age 21 was 4'11\". Today, her height is approximately 59\".           Review of Systems   Constitutional: Negative.  Negative for activity change, appetite change, fatigue and fever.   HENT: Negative.  Negative for dental problem, ear pain, hearing loss, mouth sores, sore throat, tinnitus and trouble swallowing.    Eyes: Negative.  Negative for photophobia, pain and redness.        Cataract surgery OU   Respiratory: Negative.  Negative for cough, shortness of breath and wheezing.    Cardiovascular: Negative.  Negative for chest pain and leg swelling.   Gastrointestinal: Negative.  Negative for abdominal pain, blood in stool, constipation, diarrhea and nausea.   Endocrine: Negative.    Genitourinary: Negative.  Negative for difficulty urinating, dysuria, frequency and urgency.   Musculoskeletal:  Negative for arthralgias, back pain, gait problem, joint swelling, " "myalgias and neck pain.   Skin:  Negative for rash.   Allergic/Immunologic: Negative.  Negative for food allergies.   Neurological:  Negative for dizziness, tremors, seizures, weakness, light-headedness, numbness and headaches.   Hematological: Negative.    Psychiatric/Behavioral:  Negative for agitation, behavioral problems and confusion. The patient is not nervous/anxious.      Objective:   /74 (BP Location: Left arm, Patient Position: Sitting)   Pulse 74   Ht 1.499 m (4' 11\")   Wt 58.5 kg (129 lb)   SpO2 98%   BMI 26.05 kg/m²      Physical Exam  Vitals and nursing note reviewed. Exam conducted with a chaperone present.   Constitutional:       General: She is not in acute distress.     Appearance: Normal appearance. She is normal weight. She is not ill-appearing.   HENT:      Head: Normocephalic.      Nose: Nose normal.      Mouth/Throat:      Mouth: Mucous membranes are moist.   Eyes:      General: No scleral icterus.     Extraocular Movements: Extraocular movements intact.      Conjunctiva/sclera: Conjunctivae normal.      Pupils: Pupils are equal, round, and reactive to light.   Neck:      Vascular: No carotid bruit.   Cardiovascular:      Rate and Rhythm: Normal rate and regular rhythm.      Pulses: Normal pulses.      Heart sounds: Normal heart sounds. No murmur heard.     No gallop.   Pulmonary:      Effort: Pulmonary effort is normal. No respiratory distress.      Breath sounds: Normal breath sounds. No wheezing, rhonchi or rales.   Abdominal:      General: Abdomen is flat.      Palpations: Abdomen is soft.      Comments: No abdominal bruit; no megaly, masses, or pain w/exam.   Musculoskeletal:         General: No swelling, tenderness, deformity or signs of injury. Normal range of motion.      Cervical back: Normal range of motion and neck supple. No rigidity or tenderness.      Comments: Right hallux valgus, mild-moderate   Lymphadenopathy:      Cervical: No cervical adenopathy.   Skin:     " General: Skin is warm and dry.      Capillary Refill: Capillary refill takes less than 2 seconds.      Coloration: Skin is not jaundiced.      Findings: No bruising, erythema, lesion or rash.   Neurological:      General: No focal deficit present.      Mental Status: She is alert and oriented to person, place, and time.   Psychiatric:         Behavior: Behavior normal.        Assessment:   Osteoporosis (OP) - M81.0    Plan:    All queries addressed.  Laboratory studies         Ez Penny MD

## 2024-04-09 ENCOUNTER — LAB (OUTPATIENT)
Dept: LAB | Facility: LAB | Age: 74
End: 2024-04-09
Payer: MEDICARE

## 2024-04-09 DIAGNOSIS — M81.0 OSTEOPOROSIS, POST-MENOPAUSAL: ICD-10-CM

## 2024-04-09 LAB
HBV SURFACE AG SERPL QL IA: NONREACTIVE
HCV AB SER QL: NONREACTIVE
MAGNESIUM SERPL-MCNC: 2.1 MG/DL (ref 1.6–3.1)
PHOSPHATE SERPL-MCNC: 3.3 MG/DL (ref 2.5–4.5)
PTH-INTACT SERPL-MCNC: 43.7 PG/ML (ref 18.5–88)

## 2024-04-09 PROCEDURE — 87340 HEPATITIS B SURFACE AG IA: CPT

## 2024-04-09 PROCEDURE — 82523 COLLAGEN CROSSLINKS: CPT

## 2024-04-09 PROCEDURE — 86481 TB AG RESPONSE T-CELL SUSP: CPT

## 2024-04-09 PROCEDURE — 86803 HEPATITIS C AB TEST: CPT

## 2024-04-09 PROCEDURE — 36415 COLL VENOUS BLD VENIPUNCTURE: CPT

## 2024-04-09 PROCEDURE — 83970 ASSAY OF PARATHORMONE: CPT

## 2024-04-09 PROCEDURE — 84100 ASSAY OF PHOSPHORUS: CPT

## 2024-04-09 PROCEDURE — 83735 ASSAY OF MAGNESIUM: CPT

## 2024-04-11 LAB
COLLAGEN CTX SERPL-MCNC: 454 PG/ML
NIL(NEG) CONTROL SPOT COUNT: NORMAL
PANEL A SPOT COUNT: 0
PANEL B SPOT COUNT: 0
POS CONTROL SPOT COUNT: NORMAL
T-SPOT. TB INTERPRETATION: NEGATIVE

## 2024-04-24 LAB — SCAN RESULT: NORMAL

## 2024-04-30 ENCOUNTER — OFFICE VISIT (OUTPATIENT)
Dept: RHEUMATOLOGY | Facility: CLINIC | Age: 74
End: 2024-04-30
Payer: MEDICARE

## 2024-04-30 VITALS
OXYGEN SATURATION: 96 % | HEIGHT: 59 IN | DIASTOLIC BLOOD PRESSURE: 70 MMHG | BODY MASS INDEX: 25.8 KG/M2 | SYSTOLIC BLOOD PRESSURE: 110 MMHG | WEIGHT: 128 LBS

## 2024-04-30 DIAGNOSIS — M81.0 OSTEOPOROSIS, POST-MENOPAUSAL: Primary | ICD-10-CM

## 2024-04-30 PROCEDURE — 1159F MED LIST DOCD IN RCRD: CPT | Performed by: INTERNAL MEDICINE

## 2024-04-30 PROCEDURE — 1036F TOBACCO NON-USER: CPT | Performed by: INTERNAL MEDICINE

## 2024-04-30 PROCEDURE — 99213 OFFICE O/P EST LOW 20 MIN: CPT | Performed by: INTERNAL MEDICINE

## 2024-04-30 ASSESSMENT — PAIN SCALES - GENERAL: PAINLEVEL_OUTOF10: 0 - NO PAIN

## 2024-04-30 ASSESSMENT — PAIN - FUNCTIONAL ASSESSMENT: PAIN_FUNCTIONAL_ASSESSMENT: 0-10

## 2024-04-30 ASSESSMENT — PATIENT HEALTH QUESTIONNAIRE - PHQ9
2. FEELING DOWN, DEPRESSED OR HOPELESS: NOT AT ALL
SUM OF ALL RESPONSES TO PHQ9 QUESTIONS 1 AND 2: 0
1. LITTLE INTEREST OR PLEASURE IN DOING THINGS: NOT AT ALL

## 2024-04-30 ASSESSMENT — ENCOUNTER SYMPTOMS
DEPRESSION: 0
LOSS OF SENSATION IN FEET: 0
OCCASIONAL FEELINGS OF UNSTEADINESS: 0

## 2024-04-30 NOTE — PROGRESS NOTES
"Chief Complaint:    This 73 y.o. female presents with the chief complaint of osteoporosis (OP).     Subjective:   HPI   Problem:  1: OP       The patient returns to discuss results of testing for OP.       The patient requests clarification about the DXA scan.        The patient and her  ask about the therapies available for OP. The patient states that she is concerned about use of ALENDRONATE because her mother developed an indeterminate disorder of the jaw.         The patient asked whether non-pharmacologic and natural products are available for OP. She remarked that she would like to utilize these measures when appropriate and available, but did not exclude conventional treatment of OP with currently available medications.        The patient reiterated that she does eat a diet inclusive of dairy products, including milk, cottage cheese, yogurts and others.    Review of Systems   All other systems reviewed and are negative.    Objective:   /70   Ht 1.499 m (4' 11\")   Wt 58.1 kg (128 lb)   SpO2 96%   BMI 25.85 kg/m²      Physical Exam     Examination was not repeated at today's visit.     Laboratory test results were discussed in detail, answering all queries. Results of CTX and P1NP were discussed and their significance explained. I reiterated that DXA scans are done every two years while patient is on treatment to monitor response. DXA scan can be done yearly but patient would likely have to pay for the test out of pocket.     Treatment options for OP with currently available medications were discussed in detail, including oral (ALENDRONATE), IV (ZOLEDRONIC ACID), and DENOSUMAB (PROLIA)(SubQ). Each agent was individually discussed, and the indications, risks/rewards, and potential adverse reactions were discussed in detail. In particular, side effects such as infections, malignancy, musculoskeletal pain, fractures, and others were detailed. Printed information about ZOLEDRONIC ACID was provided, " and its chemical structure as a BISPHOSPHONATE was discussed. Such details as duration of treatment with each agent were discussed: about 5-7 years (ALENDRONATE & ZOLEDRONIC ACID) and no time limitation (PROLIA) were discussed. Side effects, such as jaw pain and musculoskeletal symptoms, were discussed as being common to all medications.      Patient determined that she would think about her options and let me know her decision.    Finally, it was emphasized to the patient & her  that she has definite OP, and that OP should be treated with a proven agent to prevent fractures. Moreover, VITAMIN D + CALCIUM must be used daily in order to prevent hypocalcemia and to enhance calcification of bone matrix to maximize bone remodeling and strength. Patient &  appeared to understand these explanations     Assessment:   Osteoporosis - M81.0    Plan:    I recommend patient make her decision for treatment soon, so that treatment can be implemented. She can call the office to indicate her decision. Second, I cautioned the patient about fall prevention and how to optimize safety in her home.    Ez Penny MD

## 2024-05-28 ENCOUNTER — APPOINTMENT (OUTPATIENT)
Dept: OPHTHALMOLOGY | Facility: CLINIC | Age: 74
End: 2024-05-28
Payer: MEDICARE